# Patient Record
Sex: MALE | Race: WHITE | NOT HISPANIC OR LATINO | Employment: FULL TIME | URBAN - METROPOLITAN AREA
[De-identification: names, ages, dates, MRNs, and addresses within clinical notes are randomized per-mention and may not be internally consistent; named-entity substitution may affect disease eponyms.]

---

## 2022-07-11 LAB — FLEX SIG FOLLOW UP EXTERNAL: NORMAL

## 2022-07-19 DIAGNOSIS — M79.10 MYALGIA: ICD-10-CM

## 2022-07-19 DIAGNOSIS — K50.90 CROHN DISEASE: Primary | ICD-10-CM

## 2022-07-19 DIAGNOSIS — K91.850 POUCHITIS: ICD-10-CM

## 2022-08-09 DIAGNOSIS — K50.90 CROHN DISEASE: Primary | ICD-10-CM

## 2022-08-15 ENCOUNTER — HOSPITAL ENCOUNTER (OUTPATIENT)
Dept: RADIOLOGY | Facility: HOSPITAL | Age: 57
Discharge: HOME | End: 2022-08-15
Attending: INTERNAL MEDICINE
Payer: COMMERCIAL

## 2022-08-15 DIAGNOSIS — K50.90 CROHN DISEASE: ICD-10-CM

## 2022-08-15 PROCEDURE — 74177 CT ABD & PELVIS W/CONTRAST: CPT | Mod: TC

## 2022-08-15 PROCEDURE — 25500020 PHARM REV CODE 255: Performed by: INTERNAL MEDICINE

## 2022-08-15 RX ADMIN — IOHEXOL 100 ML: 350 INJECTION, SOLUTION INTRAVENOUS at 09:08

## 2024-03-12 ENCOUNTER — OFFICE VISIT (OUTPATIENT)
Dept: FAMILY MEDICINE | Facility: CLINIC | Age: 59
End: 2024-03-12
Payer: COMMERCIAL

## 2024-03-12 VITALS
TEMPERATURE: 99 F | DIASTOLIC BLOOD PRESSURE: 74 MMHG | WEIGHT: 274 LBS | SYSTOLIC BLOOD PRESSURE: 112 MMHG | HEIGHT: 69 IN | BODY MASS INDEX: 40.58 KG/M2 | OXYGEN SATURATION: 99 % | RESPIRATION RATE: 18 BRPM | HEART RATE: 94 BPM

## 2024-03-12 DIAGNOSIS — K50.118 CROHN'S DISEASE OF LARGE INTESTINE WITH OTHER COMPLICATION: Primary | ICD-10-CM

## 2024-03-12 DIAGNOSIS — Z12.5 ENCOUNTER FOR PROSTATE CANCER SCREENING: ICD-10-CM

## 2024-03-12 DIAGNOSIS — M79.10 MYALGIA: ICD-10-CM

## 2024-03-12 DIAGNOSIS — Z00.00 PREVENTATIVE HEALTH CARE: ICD-10-CM

## 2024-03-12 DIAGNOSIS — N20.0 KIDNEY STONES: ICD-10-CM

## 2024-03-12 PROCEDURE — 3074F SYST BP LT 130 MM HG: CPT | Mod: CPTII,S$GLB,, | Performed by: FAMILY MEDICINE

## 2024-03-12 PROCEDURE — 3008F BODY MASS INDEX DOCD: CPT | Mod: CPTII,S$GLB,, | Performed by: FAMILY MEDICINE

## 2024-03-12 PROCEDURE — 99999 PR PBB SHADOW E&M-NEW PATIENT-LVL IV: CPT | Mod: PBBFAC,,, | Performed by: FAMILY MEDICINE

## 2024-03-12 PROCEDURE — 1159F MED LIST DOCD IN RCRD: CPT | Mod: CPTII,S$GLB,, | Performed by: FAMILY MEDICINE

## 2024-03-12 PROCEDURE — 3078F DIAST BP <80 MM HG: CPT | Mod: CPTII,S$GLB,, | Performed by: FAMILY MEDICINE

## 2024-03-12 PROCEDURE — 99213 OFFICE O/P EST LOW 20 MIN: CPT | Mod: S$GLB,,, | Performed by: FAMILY MEDICINE

## 2024-03-12 RX ORDER — METHYLPREDNISOLONE 4 MG/1
TABLET ORAL
Qty: 1 EACH | Refills: 0 | Status: SHIPPED | OUTPATIENT
Start: 2024-03-12

## 2024-03-12 RX ORDER — USTEKINUMAB 90 MG/ML
INJECTION, SOLUTION SUBCUTANEOUS
COMMUNITY
Start: 2022-09-11

## 2024-03-12 NOTE — PROGRESS NOTES
Subjective:       Patient ID: Analy Fuchs is a 59 y.o. male.    Chief Complaint: Establish Care and chrohns disease      Patient is here to get established.  He has a long history of Crohn's disease has been on Humira in the past but was being prepared to transfer to [facility] but had several moves due to family use and is here getting established again he sees Dr. Cordero.  Patient has a surgical J-pouch.  Patient has had progressive muscle weakness and fatigue.  Patient has been on opiates in the past but has switch to medical marijuana because of side effects. Off Cipro now.  Patient has not worked for 3 years because of problems related to his Crohn's and medication.  Muscle symptoms worsened with exercise.                Allergies and Medications:   Review of patient's allergies indicates:   Allergen Reactions    Mesalamine Shortness Of Breath     Current Outpatient Medications   Medication Sig Dispense Refill    STELARA 90 mg/mL Syrg syringe       methylPREDNISolone (MEDROL DOSEPACK) 4 mg tablet use as directed 1 each 0     No current facility-administered medications for this visit.       Family History:   History reviewed. No pertinent family history.    Social History:   Social History     Socioeconomic History    Marital status:    Tobacco Use    Smoking status: Never    Smokeless tobacco: Never   Substance and Sexual Activity    Alcohol use: Yes     Comment: occ    Drug use: Not Currently     Types: Marijuana    Sexual activity: Yes     Partners: Female     Social Determinants of Health     Financial Resource Strain: Medium Risk (3/11/2024)    Overall Financial Resource Strain (CARDIA)     Difficulty of Paying Living Expenses: Somewhat hard   Food Insecurity: No Food Insecurity (3/11/2024)    Hunger Vital Sign     Worried About Running Out of Food in the Last Year: Never true     Ran Out of Food in the Last Year: Never true   Transportation Needs: No Transportation Needs (3/11/2024)    PRAPARE -  Transportation     Lack of Transportation (Medical): No     Lack of Transportation (Non-Medical): No   Physical Activity: Inactive (3/11/2024)    Exercise Vital Sign     Days of Exercise per Week: 0 days     Minutes of Exercise per Session: 0 min   Stress: Stress Concern Present (3/11/2024)    [place] [employer] - Occupational Stress Questionnaire     Feeling of Stress : To some extent   Social Connections: Unknown (3/11/2024)    Social Connection and Isolation Panel [NHANES]     Frequency of Communication with Friends and Family: More than three times a week     Frequency of Social Gatherings with Friends and Family: Three times a week     Active Member of Clubs or Organizations: No     Attends Club or Organization Meetings: Never     Marital Status:    Housing Stability: Low Risk  (3/11/2024)    Housing Stability Vital Sign     Unable to Pay for Housing in the Last Year: No     Number of Places Lived in the Last Year: 1     Unstable Housing in the Last Year: No       Review of Systems   Constitutional:  Positive for activity change. Negative for unexpected weight change.   HENT:  Negative for hearing loss, rhinorrhea and trouble swallowing.    Eyes:  Negative for discharge and visual disturbance.   Respiratory:  Negative for chest tightness and wheezing.    Cardiovascular:  Negative for chest pain and palpitations.   Gastrointestinal:  Negative for blood in stool, constipation, diarrhea and vomiting.   Endocrine: Negative for polydipsia and polyuria.   Genitourinary:  Negative for difficulty urinating, hematuria and urgency.   Musculoskeletal:  Positive for arthralgias. Negative for joint swelling and neck pain.   Neurological:  Positive for weakness and headaches.   Psychiatric/Behavioral:  Negative for confusion and dysphoric mood.        Objective:     Vitals:    03/12/24 0822   BP: 112/74   Pulse: 94   Resp: 18   Temp: 98.7 °F (37.1 °C)        Physical Exam  Vitals and nursing note  reviewed.   Constitutional:       General: He is not in acute distress.     Appearance: He is well-developed. He is not ill-appearing, toxic-appearing or diaphoretic.   HENT:      Head: Normocephalic.   Eyes:      Conjunctiva/sclera: Conjunctivae normal.      Pupils: Pupils are equal, round, and reactive to light.   Cardiovascular:      Rate and Rhythm: Normal rate and regular rhythm.      Heart sounds: Normal heart sounds. No murmur heard.     No friction rub. No gallop.   Pulmonary:      Effort: Pulmonary effort is normal. No respiratory distress.      Breath sounds: Normal breath sounds. No stridor. No wheezing, rhonchi or rales.   Chest:      Chest wall: No tenderness.   Skin:     General: Skin is warm and dry.   Psychiatric:         Behavior: Behavior normal.         Thought Content: Thought content normal.         Judgment: Judgment normal.         Assessment:       1. Crohn's disease of large intestine with other complication    2. Preventative health care    3. Encounter for prostate cancer screening    4. Kidney stones    5. Myalgia        Plan:       [facility]    Diagnoses and all orders for this visit:    Crohn's disease of large intestine with other complication  -     CBC Auto Differential; Future  -     C-Reactive Protein; Future  -     Comprehensive Metabolic Panel; Future  -     Lipid Panel; Future  -     methylPREDNISolone (MEDROL DOSEPACK) 4 mg tablet; use as directed    Preventative health care  -     HEPATITIS C ANTIBODY; Future  -     LIPID PANEL; Future  -     HIV 1/2 Ag/Ab (4th Gen); Future    Encounter for prostate cancer screening  -     PSA, Screening; Future    Kidney stones  -     Vitamin D; Future    Myalgia  -     CK Isoenzymes; Future  -     Ambulatory referral/consult to Rheumatology; Future  -     methylPREDNISolone (MEDROL DOSEPACK) 4 mg tablet; use as directed         Follow up in about 6 months (around 9/12/2024).

## 2024-03-14 ENCOUNTER — LAB VISIT (OUTPATIENT)
Dept: LAB | Facility: HOSPITAL | Age: 59
End: 2024-03-14
Attending: FAMILY MEDICINE
Payer: COMMERCIAL

## 2024-03-14 DIAGNOSIS — Z12.5 ENCOUNTER FOR PROSTATE CANCER SCREENING: ICD-10-CM

## 2024-03-14 DIAGNOSIS — N20.0 KIDNEY STONES: ICD-10-CM

## 2024-03-14 DIAGNOSIS — K50.118 CROHN'S DISEASE OF LARGE INTESTINE WITH OTHER COMPLICATION: ICD-10-CM

## 2024-03-14 DIAGNOSIS — Z00.00 PREVENTATIVE HEALTH CARE: ICD-10-CM

## 2024-03-14 DIAGNOSIS — M79.10 MYALGIA: ICD-10-CM

## 2024-03-14 LAB
25(OH)D3+25(OH)D2 SERPL-MCNC: 27 NG/ML (ref 30–96)
ALBUMIN SERPL BCP-MCNC: 3.9 G/DL (ref 3.5–5.2)
ALP SERPL-CCNC: 103 U/L (ref 55–135)
ALT SERPL W/O P-5'-P-CCNC: 21 U/L (ref 10–44)
ANION GAP SERPL CALC-SCNC: 10 MMOL/L (ref 8–16)
AST SERPL-CCNC: 23 U/L (ref 10–40)
BASOPHILS # BLD AUTO: 0.08 K/UL (ref 0–0.2)
BASOPHILS NFR BLD: 0.5 % (ref 0–1.9)
BILIRUB SERPL-MCNC: 0.7 MG/DL (ref 0.1–1)
BUN SERPL-MCNC: 14 MG/DL (ref 6–20)
CALCIUM SERPL-MCNC: 9.4 MG/DL (ref 8.7–10.5)
CHLORIDE SERPL-SCNC: 106 MMOL/L (ref 95–110)
CHOLEST SERPL-MCNC: 134 MG/DL (ref 120–199)
CHOLEST SERPL-MCNC: 134 MG/DL (ref 120–199)
CHOLEST/HDLC SERPL: 2.6 {RATIO} (ref 2–5)
CHOLEST/HDLC SERPL: 2.6 {RATIO} (ref 2–5)
CO2 SERPL-SCNC: 21 MMOL/L (ref 23–29)
COMPLEXED PSA SERPL-MCNC: 0.63 NG/ML (ref 0–4)
CREAT SERPL-MCNC: 0.9 MG/DL (ref 0.5–1.4)
CRP SERPL-MCNC: 6.2 MG/L (ref 0–8.2)
DIFFERENTIAL METHOD BLD: ABNORMAL
EOSINOPHIL # BLD AUTO: 0 K/UL (ref 0–0.5)
EOSINOPHIL NFR BLD: 0.1 % (ref 0–8)
ERYTHROCYTE [DISTWIDTH] IN BLOOD BY AUTOMATED COUNT: 12.9 % (ref 11.5–14.5)
EST. GFR  (NO RACE VARIABLE): >60 ML/MIN/1.73 M^2
GLUCOSE SERPL-MCNC: 108 MG/DL (ref 70–110)
HCT VFR BLD AUTO: 50 % (ref 40–54)
HCV AB SERPL QL IA: NORMAL
HDLC SERPL-MCNC: 51 MG/DL (ref 40–75)
HDLC SERPL-MCNC: 51 MG/DL (ref 40–75)
HDLC SERPL: 38.1 % (ref 20–50)
HDLC SERPL: 38.1 % (ref 20–50)
HGB BLD-MCNC: 16.5 G/DL (ref 14–18)
HIV 1+2 AB+HIV1 P24 AG SERPL QL IA: NORMAL
IMM GRANULOCYTES # BLD AUTO: 0.07 K/UL (ref 0–0.04)
IMM GRANULOCYTES NFR BLD AUTO: 0.4 % (ref 0–0.5)
LDLC SERPL CALC-MCNC: 71.8 MG/DL (ref 63–159)
LDLC SERPL CALC-MCNC: 71.8 MG/DL (ref 63–159)
LYMPHOCYTES # BLD AUTO: 1.4 K/UL (ref 1–4.8)
LYMPHOCYTES NFR BLD: 8.8 % (ref 18–48)
MCH RBC QN AUTO: 31.8 PG (ref 27–31)
MCHC RBC AUTO-ENTMCNC: 33 G/DL (ref 32–36)
MCV RBC AUTO: 96 FL (ref 82–98)
MONOCYTES # BLD AUTO: 0.9 K/UL (ref 0.3–1)
MONOCYTES NFR BLD: 5.3 % (ref 4–15)
NEUTROPHILS # BLD AUTO: 13.5 K/UL (ref 1.8–7.7)
NEUTROPHILS NFR BLD: 84.9 % (ref 38–73)
NONHDLC SERPL-MCNC: 83 MG/DL
NONHDLC SERPL-MCNC: 83 MG/DL
NRBC BLD-RTO: 0 /100 WBC
PLATELET # BLD AUTO: 348 K/UL (ref 150–450)
PMV BLD AUTO: 10.8 FL (ref 9.2–12.9)
POTASSIUM SERPL-SCNC: 4.3 MMOL/L (ref 3.5–5.1)
PROT SERPL-MCNC: 8.3 G/DL (ref 6–8.4)
RBC # BLD AUTO: 5.19 M/UL (ref 4.6–6.2)
SODIUM SERPL-SCNC: 137 MMOL/L (ref 136–145)
TRIGL SERPL-MCNC: 56 MG/DL (ref 30–150)
TRIGL SERPL-MCNC: 56 MG/DL (ref 30–150)
WBC # BLD AUTO: 15.92 K/UL (ref 3.9–12.7)

## 2024-03-14 PROCEDURE — 85025 COMPLETE CBC W/AUTO DIFF WBC: CPT | Performed by: FAMILY MEDICINE

## 2024-03-14 PROCEDURE — 36415 COLL VENOUS BLD VENIPUNCTURE: CPT | Performed by: FAMILY MEDICINE

## 2024-03-14 PROCEDURE — 84153 ASSAY OF PSA TOTAL: CPT | Performed by: FAMILY MEDICINE

## 2024-03-14 PROCEDURE — 87389 HIV-1 AG W/HIV-1&-2 AB AG IA: CPT | Performed by: FAMILY MEDICINE

## 2024-03-14 PROCEDURE — 86140 C-REACTIVE PROTEIN: CPT | Performed by: FAMILY MEDICINE

## 2024-03-14 PROCEDURE — 82552 ASSAY OF CPK IN BLOOD: CPT | Performed by: FAMILY MEDICINE

## 2024-03-14 PROCEDURE — 80061 LIPID PANEL: CPT | Performed by: FAMILY MEDICINE

## 2024-03-14 PROCEDURE — 86803 HEPATITIS C AB TEST: CPT | Performed by: FAMILY MEDICINE

## 2024-03-14 PROCEDURE — 82306 VITAMIN D 25 HYDROXY: CPT | Performed by: FAMILY MEDICINE

## 2024-03-14 PROCEDURE — 80053 COMPREHEN METABOLIC PANEL: CPT | Performed by: FAMILY MEDICINE

## 2024-03-15 ENCOUNTER — TELEPHONE (OUTPATIENT)
Dept: FAMILY MEDICINE | Facility: CLINIC | Age: 59
End: 2024-03-15
Payer: COMMERCIAL

## 2024-03-15 DIAGNOSIS — E55.9 VITAMIN D DEFICIENCY: Primary | ICD-10-CM

## 2024-03-15 RX ORDER — ERGOCALCIFEROL 1.25 MG/1
50000 CAPSULE ORAL
Qty: 4 CAPSULE | Refills: 11 | Status: SHIPPED | OUTPATIENT
Start: 2024-03-15 | End: 2025-03-15

## 2024-03-15 NOTE — PROGRESS NOTES
Vitamin-D level is low.  We will initiate vitamin-D 92606 units per week, and recheck vitamin-D in 3 months.  I will send in orders.

## 2024-03-15 NOTE — TELEPHONE ENCOUNTER
----- Message from Jovanny Ag MD sent at 3/15/2024  8:02 AM CDT -----  Vitamin-D level is low.  We will initiate vitamin-D 78428 units per week, and recheck vitamin-D in 3 months.  I will send in orders.

## 2024-03-19 LAB
CK BB CFR SERPL ELPH: 0 %
CK MB CFR SERPL ELPH: 0 % (ref 0–3.3)
CK MM CFR SERPL ELPH: 100 % (ref 96.7–100)
CK SERPL-CCNC: 53 U/L (ref 30–223)

## 2024-03-22 ENCOUNTER — OFFICE VISIT (OUTPATIENT)
Dept: RHEUMATOLOGY | Facility: CLINIC | Age: 59
End: 2024-03-22
Payer: COMMERCIAL

## 2024-03-22 VITALS
DIASTOLIC BLOOD PRESSURE: 81 MMHG | SYSTOLIC BLOOD PRESSURE: 121 MMHG | HEART RATE: 67 BPM | HEIGHT: 69 IN | BODY MASS INDEX: 41.54 KG/M2 | WEIGHT: 280.44 LBS

## 2024-03-22 DIAGNOSIS — K50.90 CROHN'S DISEASE WITHOUT COMPLICATION, UNSPECIFIED GASTROINTESTINAL TRACT LOCATION: ICD-10-CM

## 2024-03-22 DIAGNOSIS — M79.10 MYALGIA: ICD-10-CM

## 2024-03-22 DIAGNOSIS — M79.7 FIBROMYALGIA: Primary | ICD-10-CM

## 2024-03-22 PROCEDURE — 99999 PR PBB SHADOW E&M-EST. PATIENT-LVL III: CPT | Mod: PBBFAC,,, | Performed by: INTERNAL MEDICINE

## 2024-03-22 PROCEDURE — 3079F DIAST BP 80-89 MM HG: CPT | Mod: CPTII,S$GLB,, | Performed by: INTERNAL MEDICINE

## 2024-03-22 PROCEDURE — 3008F BODY MASS INDEX DOCD: CPT | Mod: CPTII,S$GLB,, | Performed by: INTERNAL MEDICINE

## 2024-03-22 PROCEDURE — 1160F RVW MEDS BY RX/DR IN RCRD: CPT | Mod: CPTII,S$GLB,, | Performed by: INTERNAL MEDICINE

## 2024-03-22 PROCEDURE — 1159F MED LIST DOCD IN RCRD: CPT | Mod: CPTII,S$GLB,, | Performed by: INTERNAL MEDICINE

## 2024-03-22 PROCEDURE — 3074F SYST BP LT 130 MM HG: CPT | Mod: CPTII,S$GLB,, | Performed by: INTERNAL MEDICINE

## 2024-03-22 PROCEDURE — 99204 OFFICE O/P NEW MOD 45 MIN: CPT | Mod: S$GLB,,, | Performed by: INTERNAL MEDICINE

## 2024-03-22 RX ORDER — GABAPENTIN 300 MG/1
300 CAPSULE ORAL 3 TIMES DAILY
Qty: 90 CAPSULE | Refills: 6 | Status: SHIPPED | OUTPATIENT
Start: 2024-03-22 | End: 2024-04-12 | Stop reason: SINTOL

## 2024-03-22 NOTE — PROGRESS NOTES
Patient name: Analy Fuchs  Date: 03/22/2024      History of Present Illness:    59 y.o. male with crohn's disease presents to the clinic for muscle pain x3 years. Three years ago, he was on started on daily Ciprofloxacin for recurrent J-pouch infections and continued it for about two years. Muscle pain gradually increased, then eventually became constant. Constant pain feels sore and achy and is located in hands, wrists, forearms, upper arms, thighs, and legs. He also has shoulder and hip pain when laying on sides. Pain wakes him up multiple times at night. Numbness/tingling in arms present when sitting in certain positions. He has to continue moving arms and legs every few minutes to prevent increased pain. Morning stiffness lasts about five minutes. He also reports pain with walking and hand/arm swelling with bowling. Shortness of breath also occurs with walking. Patient states that he feels overheated, nauseous, and fatigue by evenings. Narcotics were prescribed briefly but didn't help and caused night sweats. Nightly medical marijuana did help reduce nighttime pain. He is currently only taking Stalara and ergocalciferol. He does not take ibuprofen due to Crohn's and takes no other OTC pain meds. He has tried aspirin which didn't help. Recent steroid course given by PCP helped but pain resumed the next day.     Denies rashes, skin tightness, dry eye, dry mouth, joint swelling, and oral ulcers.  No history of blood clots.    Review of Systems   Constitutional:  Negative for chills, diaphoresis and fever.   HENT:  Negative for congestion, ear pain and sore throat.    Eyes:  Negative for pain and redness.   Respiratory:  Positive for shortness of breath. Negative for cough, hemoptysis and wheezing.    Cardiovascular:  Negative for chest pain and leg swelling.   Gastrointestinal:  Positive for diarrhea. Negative for abdominal pain, blood in stool, constipation, melena, nausea and vomiting.   Genitourinary:   "Negative for flank pain and hematuria.   Musculoskeletal:  Positive for joint pain (shoulders, wrists, hips) and myalgias. Negative for back pain, falls and neck pain.   Skin:  Negative for rash.   Neurological:  Positive for tingling (hands, forearms), weakness and headaches. Negative for dizziness, tremors, focal weakness, seizures and loss of consciousness.   Endo/Heme/Allergies:  Does not bruise/bleed easily.   Psychiatric/Behavioral:  Negative for depression, hallucinations, substance abuse and suicidal ideas. The patient is not nervous/anxious and does not have insomnia.       Past Medical History:  -Crohn's disease  -Kidney stones    Current Outpatient Medications on File Prior to Visit   Medication Sig Dispense Refill    ergocalciferol (ERGOCALCIFEROL) 50,000 unit Cap Take 1 capsule (50,000 Units total) by mouth every 7 days. 4 capsule 11    methylPREDNISolone (MEDROL DOSEPACK) 4 mg tablet use as directed 1 each 0    STELARA 90 mg/mL Syrg syringe        No current facility-administered medications on file prior to visit.          OBJECTIVE    Vitals:    HR 67     /81     Weight 127.2 kg     Height 5' 9"     BMI 41.41    Physical Exam   Constitutional: normal appearance. He appears obese.  Non-toxic appearance. No distress.   HENT:   Head: Normocephalic and atraumatic.   Mouth/Throat: Mucous membranes are moist. Oropharynx is clear.   Eyes: Pupils are equal, round, and reactive to light.   Cardiovascular: Normal rate and regular rhythm. Pulmonary:      Effort: Pulmonary effort is normal.      Breath sounds: Normal breath sounds.     Abdominal: Normal appearance and bowel sounds are normal.   Musculoskeletal:      Cervical back: Normal range of motion and neck supple.   Neurological: He is alert.   Skin: Skin is warm and dry.   Psychiatric: His behavior is normal. Mood, judgment and thought content normal.       Physical Exam:  -Bilateral wrist pain with over-extension   -Right hand: wrist,    -Arms: " "forearm, upper arm, and deltoid tenderness "sore"  -Shoulders: pain with flexion and abduction, tender to palpation.   -Legs: multiple tender areas    Labs:    from 03/14/2024  -CBC: WBC 15.92  -CMP: normal  -CRP: normal  -CK: normal  -Hep C: negative  -HIV: negative      Assessment/Plan:    Fibromyalgia. Patient has chronic muscle pain for 3 years, trouble sleeping, fatigue, and headaches.   Start Gabapentin 300 mg PO three times a day. If unable to tolerate, stop medication.  Recommended regular exercise  Follow up in 4 months    I have personally taken the history and examined the patient and concur with the student's note as above.  He has a long history of Crohn's disease.  His disease has been inactive recently.  He has been on Stelara which has helped control his disease.  Three years ago he was placed on Cipro for recurrent infections.  He remained on it for 2 years.  During this time he developed diffuse aching.  The pain was of gradual onset.  The pain is been constant.  It does not occur at a particular time of the day.  He has 5 minutes of morning stiffness.  He was taken off Cipro 1 year ago but his pain persists.  He has had no joint swelling.  The pain has not been helped by Stelara.  He has been taking no medication for the pain.      He has had no recent fevers.  He has occipital headaches.  He has had no rash, conjunctivitis, oral ulcers, dry eye or mouth, Raynaud's phenomena, pleurisy, urethral discharge or ulcers, numbness or tingling.  He has no thrombophlebitis.  He denies any problems in the neck or back.  He states his weight has been stable.      Physical examination:  Musculoskeletal:  He is full range of motion of all joints.  He has no soft tissue swelling, erythema, or increased warmth.  He has multiple tender areas in both articular and nonarticular areas.    Assessment:  Clinically he only has fibromyalgia.  I do not think his pain is related to his inflammatory bowel " disease.    Plans:  1. I started him on gabapentin 300 mg t.i.d..  I told him the dose could be increased in the future.  2.  Return to see me in 4 months              Answers submitted by the patient for this visit:  Rheumatology Questionnaire (Submitted on 3/15/2024)  mouth sores: No  trouble swallowing: No  unexpected weight change: No  genital sore: No

## 2024-04-11 ENCOUNTER — PATIENT MESSAGE (OUTPATIENT)
Dept: RHEUMATOLOGY | Facility: CLINIC | Age: 59
End: 2024-04-11
Payer: COMMERCIAL

## 2024-04-12 RX ORDER — PREGABALIN 25 MG/1
25 CAPSULE ORAL 2 TIMES DAILY
Qty: 60 CAPSULE | Refills: 2 | Status: SHIPPED | OUTPATIENT
Start: 2024-04-12 | End: 2024-05-06 | Stop reason: SDUPTHER

## 2024-05-01 ENCOUNTER — PATIENT MESSAGE (OUTPATIENT)
Dept: RHEUMATOLOGY | Facility: CLINIC | Age: 59
End: 2024-05-01
Payer: COMMERCIAL

## 2024-05-06 RX ORDER — PREGABALIN 75 MG/1
75 CAPSULE ORAL 2 TIMES DAILY
Qty: 60 CAPSULE | Refills: 2 | Status: SHIPPED | OUTPATIENT
Start: 2024-05-06 | End: 2024-05-31 | Stop reason: SDUPTHER

## 2024-05-31 RX ORDER — PREGABALIN 75 MG/1
CAPSULE ORAL
Qty: 90 CAPSULE | Refills: 2 | Status: SHIPPED | OUTPATIENT
Start: 2024-05-31

## 2024-06-19 ENCOUNTER — PATIENT MESSAGE (OUTPATIENT)
Dept: RHEUMATOLOGY | Facility: CLINIC | Age: 59
End: 2024-06-19
Payer: COMMERCIAL

## 2024-06-24 ENCOUNTER — OFFICE VISIT (OUTPATIENT)
Dept: FAMILY MEDICINE | Facility: CLINIC | Age: 59
End: 2024-06-24
Payer: COMMERCIAL

## 2024-06-24 VITALS
TEMPERATURE: 98 F | RESPIRATION RATE: 18 BRPM | OXYGEN SATURATION: 97 % | SYSTOLIC BLOOD PRESSURE: 110 MMHG | DIASTOLIC BLOOD PRESSURE: 84 MMHG | WEIGHT: 262 LBS | BODY MASS INDEX: 38.8 KG/M2 | HEIGHT: 69 IN | HEART RATE: 67 BPM

## 2024-06-24 DIAGNOSIS — J20.9 ACUTE BRONCHITIS, UNSPECIFIED ORGANISM: Primary | ICD-10-CM

## 2024-06-24 PROCEDURE — 99213 OFFICE O/P EST LOW 20 MIN: CPT | Mod: S$GLB,,, | Performed by: FAMILY MEDICINE

## 2024-06-24 PROCEDURE — 99999 PR PBB SHADOW E&M-EST. PATIENT-LVL III: CPT | Mod: PBBFAC,,, | Performed by: FAMILY MEDICINE

## 2024-06-24 PROCEDURE — 3079F DIAST BP 80-89 MM HG: CPT | Mod: CPTII,S$GLB,, | Performed by: FAMILY MEDICINE

## 2024-06-24 PROCEDURE — 1159F MED LIST DOCD IN RCRD: CPT | Mod: CPTII,S$GLB,, | Performed by: FAMILY MEDICINE

## 2024-06-24 PROCEDURE — 3008F BODY MASS INDEX DOCD: CPT | Mod: CPTII,S$GLB,, | Performed by: FAMILY MEDICINE

## 2024-06-24 PROCEDURE — 3074F SYST BP LT 130 MM HG: CPT | Mod: CPTII,S$GLB,, | Performed by: FAMILY MEDICINE

## 2024-06-24 RX ORDER — AZITHROMYCIN 250 MG/1
250 TABLET, FILM COATED ORAL DAILY
Qty: 6 TABLET | Refills: 0 | Status: SHIPPED | OUTPATIENT
Start: 2024-06-24 | End: 2024-06-30

## 2024-06-24 RX ORDER — PROMETHAZINE HYDROCHLORIDE AND DEXTROMETHORPHAN HYDROBROMIDE 6.25; 15 MG/5ML; MG/5ML
10 SYRUP ORAL NIGHTLY
Qty: 180 ML | Refills: 2 | Status: SHIPPED | OUTPATIENT
Start: 2024-06-24 | End: 2024-08-17

## 2024-06-24 NOTE — PROGRESS NOTES
Subjective:       Patient ID: Analy Fuchs is a 59 y.o. male.    Chief Complaint: Cough and Nasal Congestion      Is here because of upper respiratory symptoms that started approximately 7 days ago getting off of a flight.  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  3  Lab Results       Component                Value               Date                       WBC                      15.92 (H)           03/14/2024                 HGB                      16.5                03/14/2024                 HCT                      50.0                03/14/2024                 PLT                      348                 03/14/2024                 CHOL                     134                 03/14/2024                 CHOL                     134                 03/14/2024                 TRIG                     56                  03/14/2024                 TRIG                     56                  03/14/2024                 HDL                      51                  03/14/2024                 HDL                      51                  03/14/2024                 ALT                      21                  03/14/2024                 AST                      23                  03/14/2024                 NA                       137                 03/14/2024                 K                        4.3                 03/14/2024                 CL                       106                 03/14/2024                 CREATININE               0.9                 03/14/2024                 BUN                      14                  03/14/2024                 CO2                      21 (L)              03/14/2024                 PSA                      0.63                03/14/2024                Sore Throat   This is a new problem. The current episode started in the past 7 days. The problem has been gradually worsening. Neither side of throat is  experiencing more pain than the other. The fever has been present for 5 days or more. The pain is at a severity of 4/10. The pain is moderate. Associated symptoms include congestion, coughing, headaches and a plugged ear sensation. Pertinent negatives include no abdominal pain, diarrhea, drooling, ear discharge, ear pain, hoarse voice, neck pain, shortness of breath, stridor, swollen glands, trouble swallowing or vomiting. He has tried acetaminophen for the symptoms. The treatment provided no relief.       Allergies and Medications:   Review of patient's allergies indicates:   Allergen Reactions    Mesalamine Shortness Of Breath     Current Outpatient Medications   Medication Sig Dispense Refill    ergocalciferol (ERGOCALCIFEROL) 50,000 unit Cap Take 1 capsule (50,000 Units total) by mouth every 7 days. 4 capsule 11    pregabalin (LYRICA) 75 MG capsule One in the morning, 2 at bedtime. 90 capsule 2    STELARA 90 mg/mL Syrg syringe       azithromycin (Z-RUIZ) 250 MG tablet Take 1 tablet (250 mg total) by mouth once daily. po on day 1 then 1 tab po on days 2-5 for 6 doses 6 tablet 0    promethazine-dextromethorphan (PROMETHAZINE-DM) 6.25-15 mg/5 mL Syrp Take 10 mLs by mouth every evening. 180 mL 2     No current facility-administered medications for this visit.       Family History:   No family history on file.    Social History:   Social History     Socioeconomic History    Marital status:    Tobacco Use    Smoking status: Former     Current packs/day: 0.00     Types: Cigarettes     Quit date: 2000     Years since quittin.4    Smokeless tobacco: Never   Substance and Sexual Activity    Alcohol use: Yes     Comment: occ    Drug use: Not Currently     Types: Marijuana    Sexual activity: Yes     Partners: Female     Social Determinants of Health     Financial Resource Strain: Medium Risk (3/11/2024)    Overall Financial Resource Strain (CARDIA)     Difficulty of Paying Living Expenses: Somewhat hard   Food  Insecurity: No Food Insecurity (3/11/2024)    Hunger Vital Sign     Worried About Running Out of Food in the Last Year: Never true     Ran Out of Food in the Last Year: Never true   Transportation Needs: No Transportation Needs (3/11/2024)    PRAPARE - Transportation     Lack of Transportation (Medical): No     Lack of Transportation (Non-Medical): No   Physical Activity: Inactive (3/11/2024)    Exercise Vital Sign     Days of Exercise per Week: 0 days     Minutes of Exercise per Session: 0 min   Stress: Stress Concern Present (3/11/2024)    [facility] [place] of [employer]ational [employer] - Occupational Stress Questionnaire     Feeling of Stress : To some extent   Housing Stability: Low Risk  (3/11/2024)    Housing Stability Vital Sign     Unable to Pay for Housing in the Last Year: No     Number of Places Lived in the Last Year: 1     Unstable Housing in the Last Year: No       Review of Systems   Constitutional:  Negative for chills and fever.   HENT:  Positive for congestion and sore throat. Negative for drooling, ear discharge, ear pain, hoarse voice, postnasal drip and trouble swallowing.    Respiratory:  Positive for cough. Negative for shortness of breath and stridor.    Cardiovascular:  Negative for chest pain.   Gastrointestinal:  Negative for abdominal pain, diarrhea and vomiting.   Musculoskeletal:  Negative for neck pain.   Skin:  Negative for rash.   Neurological:  Positive for headaches.       Objective:     Vitals:    06/24/24 1507   BP: 110/84   Pulse: 67   Resp: 18   Temp: 98.2 °F (36.8 °C)        Physical Exam  Vitals and nursing note reviewed.   Constitutional:       General: He is not in acute distress.     Appearance: He is well-developed. He is not diaphoretic.   HENT:      Head: Normocephalic and atraumatic.      Right Ear: Hearing, tympanic membrane, ear canal and external ear normal. No decreased hearing noted. No drainage, swelling or tenderness. No middle ear effusion. No foreign body. No  hemotympanum. Tympanic membrane is not injected, scarred, perforated, erythematous, retracted or bulging. Tympanic membrane has normal mobility.      Left Ear: Hearing, tympanic membrane, ear canal and external ear normal. No decreased hearing noted. No drainage, swelling or tenderness.  No middle ear effusion. No foreign body. No hemotympanum. Tympanic membrane is not injected, scarred, perforated, erythematous, retracted or bulging. Tympanic membrane has normal mobility.      Nose: Nose normal. No nasal deformity, septal deviation, laceration, mucosal edema or rhinorrhea.      Right Sinus: No maxillary sinus tenderness or frontal sinus tenderness.      Left Sinus: No maxillary sinus tenderness or frontal sinus tenderness.      Mouth/Throat:      Dentition: Normal dentition.      Pharynx: Uvula midline. No oropharyngeal exudate or posterior oropharyngeal erythema.      Tonsils: No tonsillar abscesses.   Eyes:      General: No scleral icterus.        Right eye: No discharge.         Left eye: No discharge.      Conjunctiva/sclera: Conjunctivae normal.      Pupils: Pupils are equal, round, and reactive to light.   Neck:      Thyroid: No thyromegaly.   Cardiovascular:      Rate and Rhythm: Normal rate and regular rhythm.      Heart sounds: Normal heart sounds. No murmur heard.     No friction rub. No gallop.   Pulmonary:      Effort: Pulmonary effort is normal. No respiratory distress.      Breath sounds: Normal breath sounds. No stridor. No wheezing, rhonchi or rales.   Chest:      Chest wall: No tenderness.   Musculoskeletal:      Cervical back: Normal range of motion and neck supple.   Lymphadenopathy:      Cervical: No cervical adenopathy.       P.o. CT COVID test is positive.  Assessment:       1. Acute bronchitis, unspecified organism        Plan:       Analy was seen today for cough and nasal congestion.    Diagnoses and all orders for this visit:    Acute bronchitis, unspecified organism  -     POCT COVID-19  Rapid Screening  -     promethazine-dextromethorphan (PROMETHAZINE-DM) 6.25-15 mg/5 mL Syrp; Take 10 mLs by mouth every evening.  -     azithromycin (Z-RUIZ) 250 MG tablet; Take 1 tablet (250 mg total) by mouth once daily. po on day 1 then 1 tab po on days 2-5 for 6 doses         No follow-ups on file.

## 2024-06-28 ENCOUNTER — PATIENT MESSAGE (OUTPATIENT)
Dept: FAMILY MEDICINE | Facility: CLINIC | Age: 59
End: 2024-06-28
Payer: COMMERCIAL

## 2024-08-02 ENCOUNTER — PATIENT MESSAGE (OUTPATIENT)
Dept: ADMINISTRATIVE | Facility: HOSPITAL | Age: 59
End: 2024-08-02
Payer: COMMERCIAL

## 2024-08-06 ENCOUNTER — PATIENT OUTREACH (OUTPATIENT)
Dept: ADMINISTRATIVE | Facility: HOSPITAL | Age: 59
End: 2024-08-06
Payer: COMMERCIAL

## 2024-09-05 ENCOUNTER — OFFICE VISIT (OUTPATIENT)
Dept: FAMILY MEDICINE | Facility: CLINIC | Age: 59
End: 2024-09-05
Payer: COMMERCIAL

## 2024-09-05 VITALS
BODY MASS INDEX: 39.4 KG/M2 | WEIGHT: 266 LBS | SYSTOLIC BLOOD PRESSURE: 100 MMHG | DIASTOLIC BLOOD PRESSURE: 60 MMHG | HEIGHT: 69 IN | TEMPERATURE: 98 F | OXYGEN SATURATION: 97 % | RESPIRATION RATE: 18 BRPM | HEART RATE: 98 BPM

## 2024-09-05 DIAGNOSIS — K50.10 CROHN'S DISEASE OF LARGE INTESTINE WITHOUT COMPLICATION: ICD-10-CM

## 2024-09-05 DIAGNOSIS — K50.118 CROHN'S DISEASE OF LARGE INTESTINE WITH OTHER COMPLICATION: Primary | ICD-10-CM

## 2024-09-05 DIAGNOSIS — R79.82 ELEVATED C-REACTIVE PROTEIN (CRP): ICD-10-CM

## 2024-09-05 DIAGNOSIS — Z00.00 PREVENTATIVE HEALTH CARE: ICD-10-CM

## 2024-09-05 DIAGNOSIS — M79.7 FIBROMYALGIA: ICD-10-CM

## 2024-09-05 PROCEDURE — 3078F DIAST BP <80 MM HG: CPT | Mod: CPTII,S$GLB,, | Performed by: FAMILY MEDICINE

## 2024-09-05 PROCEDURE — 99213 OFFICE O/P EST LOW 20 MIN: CPT | Mod: S$GLB,,, | Performed by: FAMILY MEDICINE

## 2024-09-05 PROCEDURE — 3008F BODY MASS INDEX DOCD: CPT | Mod: CPTII,S$GLB,, | Performed by: FAMILY MEDICINE

## 2024-09-05 PROCEDURE — 1159F MED LIST DOCD IN RCRD: CPT | Mod: CPTII,S$GLB,, | Performed by: FAMILY MEDICINE

## 2024-09-05 PROCEDURE — 3074F SYST BP LT 130 MM HG: CPT | Mod: CPTII,S$GLB,, | Performed by: FAMILY MEDICINE

## 2024-09-05 PROCEDURE — 99999 PR PBB SHADOW E&M-EST. PATIENT-LVL IV: CPT | Mod: PBBFAC,,, | Performed by: FAMILY MEDICINE

## 2024-09-05 NOTE — PROGRESS NOTES
Subjective:       Patient ID: Analy Fuchs is a 59 y.o. male.    Chief Complaint: chrohns disease      Patient is here for six-month follow-up he did see his GI doctor yesterday and had some abnormal labs.  Lab Results       Component                Value               Date                       WBC                      15.92 (H)           03/14/2024                 HGB                      16.5                03/14/2024                 HCT                      50.0                03/14/2024                 PLT                      348                 03/14/2024                 CHOL                     134                 03/14/2024                 CHOL                     134                 03/14/2024                 TRIG                     56                  03/14/2024                 TRIG                     56                  03/14/2024                 HDL                      51                  03/14/2024                 HDL                      51                  03/14/2024                 ALT                      21                  03/14/2024                 AST                      23                  03/14/2024                 NA                       137                 03/14/2024                 K                        4.3                 03/14/2024                 CL                       106                 03/14/2024                 CREATININE               0.9                 03/14/2024                 BUN                      14                  03/14/2024                 CO2                      21 (L)              03/14/2024                 PSA                      0.63                03/14/2024            He had an elevated CRP.  11.2.  Has noticed some looser stools with mucus but no blood denies fever.  Taking Lyrica for fibromyalgia.  Does get some relief with marijuana.  Wt Readings from Last 4 Encounters:  09/05/24 : 120.7 kg (266 lb)  06/24/24 : 118.8 kg (262 lb)  03/22/24 : 127.2  kg (280 lb 6.8 oz)  24 : 124.3 kg (274 lb)  Has completely stopped alcohol.               Allergies and Medications:   Review of patient's allergies indicates:   Allergen Reactions    Mesalamine Shortness Of Breath     Current Outpatient Medications   Medication Sig Dispense Refill    ergocalciferol (ERGOCALCIFEROL) 50,000 unit Cap Take 1 capsule (50,000 Units total) by mouth every 7 days. 4 capsule 11    pregabalin (LYRICA) 75 MG capsule One in the morning, 2 at bedtime. 90 capsule 2    STELARA 90 mg/mL Syrg syringe        No current facility-administered medications for this visit.       Family History:   No family history on file.    Social History:   Social History     Socioeconomic History    Marital status:    Tobacco Use    Smoking status: Former     Current packs/day: 0.00     Types: Cigarettes     Quit date:      Years since quittin.6    Smokeless tobacco: Never   Substance and Sexual Activity    Alcohol use: Yes     Comment: occ    Drug use: Not Currently     Types: Marijuana    Sexual activity: Yes     Partners: Female     Social Determinants of Health     Financial Resource Strain: Medium Risk (3/11/2024)    Overall Financial Resource Strain (CARDIA)     Difficulty of Paying Living Expenses: Somewhat hard   Food Insecurity: No Food Insecurity (3/11/2024)    Hunger Vital Sign     Worried About Running Out of Food in the Last Year: Never true     Ran Out of Food in the Last Year: Never true   Transportation Needs: No Transportation Needs (3/11/2024)    PRAPARE - Transportation     Lack of Transportation (Medical): No     Lack of Transportation (Non-Medical): No   Physical Activity: Inactive (3/11/2024)    Exercise Vital Sign     Days of Exercise per Week: 0 days     Minutes of Exercise per Session: 0 min   Stress: Stress Concern Present (3/11/2024)    [employer] - Occupational Stress Questionnaire     Feeling of Stress : To some extent   Housing  Stability: Low Risk  (3/11/2024)    Housing Stability Vital Sign     Unable to Pay for Housing in the Last Year: No     Number of Places Lived in the Last Year: 1     Unstable Housing in the Last Year: No       Review of Systems   Constitutional:  Negative for activity change and unexpected weight change.   HENT:  Negative for hearing loss, rhinorrhea and trouble swallowing.    Eyes:  Negative for discharge and visual disturbance.   Respiratory:  Negative for chest tightness and wheezing.    Cardiovascular:  Negative for chest pain and palpitations.   Gastrointestinal:  Negative for blood in stool, constipation, diarrhea and vomiting.   Endocrine: Negative for polydipsia and polyuria.   Genitourinary:  Negative for difficulty urinating, hematuria and urgency.   Musculoskeletal:  Negative for arthralgias, joint swelling and neck pain.   Neurological:  Negative for weakness and headaches.   Psychiatric/Behavioral:  Negative for confusion and dysphoric mood.        Objective:     Vitals:    09/05/24 0939   BP: 100/60   Pulse: 98   Resp: 18   Temp: 97.8 °F (36.6 °C)        Physical Exam  Vitals and nursing note reviewed.   Constitutional:       Appearance: He is well-developed. He is not diaphoretic.   HENT:      Head: Normocephalic.   Eyes:      Conjunctiva/sclera: Conjunctivae normal.      Pupils: Pupils are equal, round, and reactive to light.   Neck:      Vascular: No carotid bruit.   Cardiovascular:      Rate and Rhythm: Normal rate and regular rhythm.      Heart sounds: Normal heart sounds. No murmur heard.     No friction rub. No gallop.   Pulmonary:      Effort: Pulmonary effort is normal. No respiratory distress.      Breath sounds: Normal breath sounds. No stridor. No wheezing, rhonchi or rales.   Chest:      Chest wall: No tenderness.   Musculoskeletal:      Cervical back: No rigidity or tenderness.   Lymphadenopathy:      Cervical: No cervical adenopathy.   Skin:     General: Skin is warm and dry.    Psychiatric:         Behavior: Behavior normal.         Thought Content: Thought content normal.         Judgment: Judgment normal.         Assessment:       1. Crohn's disease of large intestine with other complication    2. Elevated C-reactive protein (CRP)    3. Preventative health care    4. Fibromyalgia    5. Crohn's disease of large intestine without complication        Plan:       Analy was seen today for chrohns disease.    Diagnoses and all orders for this visit:    Crohn's disease of large intestine with other complication  -     X-Ray Chest PA And Lateral; Future  -     Sedimentation rate; Future    Elevated C-reactive protein (CRP)  -     X-Ray Chest PA And Lateral; Future  -     Sedimentation rate; Future    Preventative health care  -     Hemoglobin A1C; Future    Fibromyalgia    Crohn's disease of large intestine without complication         Follow up in about 3 months (around 12/5/2024).

## 2024-09-06 ENCOUNTER — HOSPITAL ENCOUNTER (OUTPATIENT)
Dept: RADIOLOGY | Facility: HOSPITAL | Age: 59
Discharge: HOME | End: 2024-09-06
Attending: FAMILY MEDICINE
Payer: COMMERCIAL

## 2024-09-06 ENCOUNTER — LAB VISIT (OUTPATIENT)
Dept: LAB | Facility: HOSPITAL | Age: 59
End: 2024-09-06
Attending: FAMILY MEDICINE
Payer: COMMERCIAL

## 2024-09-06 ENCOUNTER — TELEPHONE (OUTPATIENT)
Dept: FAMILY MEDICINE | Facility: CLINIC | Age: 59
End: 2024-09-06
Payer: COMMERCIAL

## 2024-09-06 DIAGNOSIS — R79.82 ELEVATED C-REACTIVE PROTEIN (CRP): ICD-10-CM

## 2024-09-06 DIAGNOSIS — Z00.00 PREVENTATIVE HEALTH CARE: ICD-10-CM

## 2024-09-06 DIAGNOSIS — K50.118 CROHN'S DISEASE OF LARGE INTESTINE WITH OTHER COMPLICATION: ICD-10-CM

## 2024-09-06 LAB
ERYTHROCYTE [SEDIMENTATION RATE] IN BLOOD BY WESTERGREN METHOD: 12 MM/HR (ref 0–10)
ESTIMATED AVG GLUCOSE: 105 MG/DL (ref 68–131)
HBA1C MFR BLD: 5.3 % (ref 4.5–6.2)

## 2024-09-06 PROCEDURE — 85651 RBC SED RATE NONAUTOMATED: CPT | Performed by: FAMILY MEDICINE

## 2024-09-06 PROCEDURE — 71046 X-RAY EXAM CHEST 2 VIEWS: CPT | Mod: TC

## 2024-09-06 PROCEDURE — 71046 X-RAY EXAM CHEST 2 VIEWS: CPT | Mod: 26,,, | Performed by: RADIOLOGY

## 2024-09-06 PROCEDURE — 36415 COLL VENOUS BLD VENIPUNCTURE: CPT | Performed by: FAMILY MEDICINE

## 2024-09-06 PROCEDURE — 83036 HEMOGLOBIN GLYCOSYLATED A1C: CPT | Performed by: FAMILY MEDICINE

## 2024-09-06 NOTE — TELEPHONE ENCOUNTER
----- Message from Jovanny Ag MD sent at 9/6/2024 11:13 AM CDT -----  Mildly elevated sed rate.  Correlates with a very mildly elevated CRP I would follow-up with GI

## 2024-09-12 RX ORDER — PREGABALIN 75 MG/1
CAPSULE ORAL
Qty: 90 CAPSULE | Refills: 0 | Status: SHIPPED | OUTPATIENT
Start: 2024-09-12

## 2024-09-18 RX ORDER — PREGABALIN 75 MG/1
CAPSULE ORAL
Qty: 90 CAPSULE | Refills: 0 | Status: SHIPPED | OUTPATIENT
Start: 2024-09-18

## 2024-10-01 LAB — FLEX SIG FOLLOW UP EXTERNAL: NORMAL

## 2024-10-07 ENCOUNTER — PATIENT OUTREACH (OUTPATIENT)
Dept: ADMINISTRATIVE | Facility: HOSPITAL | Age: 59
End: 2024-10-07
Payer: COMMERCIAL

## 2024-10-07 NOTE — PROGRESS NOTES
Population Health Chart Review & Patient Outreach Details      Additional [facility] Health Notes:               Updates Requested / Reviewed:      Updated Care Coordination Note, Care Everywhere, , Care Team Updated, and Immunizations Reconciliation Completed or Queried: [facility] Topics Overdue:      [facility] Score: 0     Patient is not due for any topics at this time.                       Health Maintenance Topic(s) Outreach Outcomes & Actions Taken:    Colorectal Cancer Screening - Outreach Outcomes & Actions Taken  : External Records Uploaded, Care Team Updated, & History Updated if Applicable

## 2024-10-08 ENCOUNTER — OFFICE VISIT (OUTPATIENT)
Dept: RHEUMATOLOGY | Facility: CLINIC | Age: 59
End: 2024-10-08
Payer: COMMERCIAL

## 2024-10-08 VITALS
SYSTOLIC BLOOD PRESSURE: 123 MMHG | HEART RATE: 82 BPM | DIASTOLIC BLOOD PRESSURE: 83 MMHG | WEIGHT: 273.56 LBS | BODY MASS INDEX: 40.4 KG/M2

## 2024-10-08 DIAGNOSIS — M79.7 FIBROMYALGIA: Primary | ICD-10-CM

## 2024-10-08 DIAGNOSIS — K50.10 CROHN'S DISEASE OF LARGE INTESTINE WITHOUT COMPLICATION: ICD-10-CM

## 2024-10-08 PROCEDURE — 3079F DIAST BP 80-89 MM HG: CPT | Mod: CPTII,S$GLB,, | Performed by: INTERNAL MEDICINE

## 2024-10-08 PROCEDURE — 3008F BODY MASS INDEX DOCD: CPT | Mod: CPTII,S$GLB,, | Performed by: INTERNAL MEDICINE

## 2024-10-08 PROCEDURE — 99999 PR PBB SHADOW E&M-EST. PATIENT-LVL III: CPT | Mod: PBBFAC,,, | Performed by: INTERNAL MEDICINE

## 2024-10-08 PROCEDURE — 1160F RVW MEDS BY RX/DR IN RCRD: CPT | Mod: CPTII,S$GLB,, | Performed by: INTERNAL MEDICINE

## 2024-10-08 PROCEDURE — 1159F MED LIST DOCD IN RCRD: CPT | Mod: CPTII,S$GLB,, | Performed by: INTERNAL MEDICINE

## 2024-10-08 PROCEDURE — 99213 OFFICE O/P EST LOW 20 MIN: CPT | Mod: S$GLB,,, | Performed by: INTERNAL MEDICINE

## 2024-10-08 PROCEDURE — 3074F SYST BP LT 130 MM HG: CPT | Mod: CPTII,S$GLB,, | Performed by: INTERNAL MEDICINE

## 2024-10-08 PROCEDURE — 3044F HG A1C LEVEL LT 7.0%: CPT | Mod: CPTII,S$GLB,, | Performed by: INTERNAL MEDICINE

## 2024-10-08 RX ORDER — PREGABALIN 100 MG/1
CAPSULE ORAL
Qty: 90 CAPSULE | Refills: 5 | Status: SHIPPED | OUTPATIENT
Start: 2024-10-08

## 2024-10-08 RX ORDER — HYDROCORTISONE ACETATE 25 MG/1
SUPPOSITORY RECTAL
COMMUNITY
Start: 2024-10-01

## 2024-10-08 RX ORDER — PREDNISONE 10 MG/1
TABLET ORAL
COMMUNITY
Start: 2024-10-01

## 2024-10-10 NOTE — PROGRESS NOTES
History of present illness:  59-year-old male I saw initially in March.  He has a 3 year history of diffuse aching.  This began when he was on Cipro for diffuse aching.  He has a history of Crohn's disease and has been on Stelara.  Clinically he has no evidence to suggest an inflammatory arthritis.  I felt he most likely had fibromyalgia.  I placed him initially on gabapentin.    He had a reaction to gabapentin and I changed him to Lyrica.  He is now on 75 mg in the morning and 150 mg at bedtime.  He is tolerating the Lyrica.  He still has some aching but it has improved.  He had no response to Tylenol.  His pain is worse with activity.  Massage gives him some relief.  He has not tried topical medications.  He has had no joint swelling.  He has had no muscle weakness.    He had a recent sigmoidoscopy which showed some evidence of active disease.  Was placed on prednisone.  This had no effect on his pain.  He was placed on an increased dose of Stelara.  He denies any other recent medical problems.      Physical examination was not performed, the entire time was counseling.    Assessment:  Fibromyalgia     Plans:   1. Increase Lyrica to 100 mg in the morning and 200 mg at bedtime.  I told him the dose could be increased up to 450 mg daily.  2.  Return in 6 months    Answers submitted by the patient for this visit:  Rheumatology Questionnaire (Submitted on 10/1/2024)  fever: No  eye redness: No  mouth sores: No  headaches: No  shortness of breath: No  chest pain: No  trouble swallowing: No  diarrhea: Yes  constipation: No  unexpected weight change: No  genital sore: No  During the last 3 days, have you had a skin rash?: No  Bruises or bleeds easily: No  cough: No

## 2024-12-11 ENCOUNTER — PATIENT MESSAGE (OUTPATIENT)
Dept: RHEUMATOLOGY | Facility: CLINIC | Age: 59
End: 2024-12-11
Payer: COMMERCIAL

## 2024-12-11 DIAGNOSIS — M79.7 FIBROMYALGIA: Primary | ICD-10-CM

## 2024-12-11 RX ORDER — PREGABALIN 150 MG/1
CAPSULE ORAL
Qty: 90 CAPSULE | Refills: 2 | Status: SHIPPED | OUTPATIENT
Start: 2024-12-11

## 2025-02-10 ENCOUNTER — PATIENT MESSAGE (OUTPATIENT)
Dept: RHEUMATOLOGY | Facility: CLINIC | Age: 60
End: 2025-02-10
Payer: COMMERCIAL

## 2025-02-10 RX ORDER — NORTRIPTYLINE HYDROCHLORIDE 10 MG/1
CAPSULE ORAL
Qty: 90 CAPSULE | Refills: 3 | Status: SHIPPED | OUTPATIENT
Start: 2025-02-10

## 2025-03-03 DIAGNOSIS — I82.0 BUDD-CHIARI SYNDROME: Primary | ICD-10-CM

## 2025-03-11 ENCOUNTER — HOSPITAL ENCOUNTER (OUTPATIENT)
Dept: RADIOLOGY | Facility: HOSPITAL | Age: 60
Discharge: HOME | End: 2025-03-11
Attending: INTERNAL MEDICINE
Payer: COMMERCIAL

## 2025-03-11 DIAGNOSIS — I82.0 BUDD-CHIARI SYNDROME: ICD-10-CM

## 2025-03-11 PROCEDURE — 76376 3D RENDER W/INTRP POSTPROCES: CPT | Mod: TC

## 2025-03-27 ENCOUNTER — OFFICE VISIT (OUTPATIENT)
Dept: FAMILY MEDICINE | Facility: CLINIC | Age: 60
End: 2025-03-27
Payer: COMMERCIAL

## 2025-03-27 VITALS
OXYGEN SATURATION: 97 % | RESPIRATION RATE: 16 BRPM | TEMPERATURE: 98 F | WEIGHT: 270.5 LBS | HEART RATE: 108 BPM | BODY MASS INDEX: 40.07 KG/M2 | SYSTOLIC BLOOD PRESSURE: 126 MMHG | HEIGHT: 69 IN | DIASTOLIC BLOOD PRESSURE: 87 MMHG

## 2025-03-27 DIAGNOSIS — R06.09 DOE (DYSPNEA ON EXERTION): ICD-10-CM

## 2025-03-27 DIAGNOSIS — K50.10 CROHN'S DISEASE OF LARGE INTESTINE WITHOUT COMPLICATION: Primary | ICD-10-CM

## 2025-03-27 DIAGNOSIS — M79.7 FIBROMYALGIA: ICD-10-CM

## 2025-03-27 DIAGNOSIS — K76.0 FATTY LIVER: ICD-10-CM

## 2025-03-27 DIAGNOSIS — R79.89 ELEVATED BRAIN NATRIURETIC PEPTIDE (BNP) LEVEL: ICD-10-CM

## 2025-03-27 DIAGNOSIS — R42 POSTURAL DIZZINESS WITH PRESYNCOPE: ICD-10-CM

## 2025-03-27 DIAGNOSIS — R55 POSTURAL DIZZINESS WITH PRESYNCOPE: ICD-10-CM

## 2025-03-27 LAB
EKG 12-LEAD: NORMAL
PR INTERVAL: NORMAL
PRT AXES: NORMAL
QRS DURATION: NORMAL
QT/QTC: NORMAL
VENTRICULAR RATE: NORMAL

## 2025-03-27 PROCEDURE — 99999 PR PBB SHADOW E&M-EST. PATIENT-LVL IV: CPT | Mod: PBBFAC,,, | Performed by: FAMILY MEDICINE

## 2025-03-27 RX ORDER — ERGOCALCIFEROL 1.25 MG/1
50000 CAPSULE ORAL
COMMUNITY
Start: 2024-12-28

## 2025-03-27 RX ORDER — PANTOPRAZOLE SODIUM 40 MG/1
TABLET, DELAYED RELEASE ORAL
COMMUNITY
Start: 2025-03-11

## 2025-03-27 NOTE — PROGRESS NOTES
Subjective:       Patient ID: Analy Fuchs is a 60 y.o. male.    Chief Complaint: Breathing Problem and Fatigue    Lab Results   Component Value Date    WBC 15.92 (H) 03/14/2024    HGB 16.5 03/14/2024    HCT 50.0 03/14/2024     03/14/2024    CHOL 134 03/14/2024    CHOL 134 03/14/2024    TRIG 56 03/14/2024    TRIG 56 03/14/2024    HDL 51 03/14/2024    HDL 51 03/14/2024    ALT 21 03/14/2024    AST 23 03/14/2024     03/14/2024    K 4.3 03/14/2024     03/14/2024    CREATININE 0.9 03/14/2024    BUN 14 03/14/2024    CO2 21 (L) 03/14/2024    PSA 0.63 03/14/2024    HGBA1C 5.3 09/06/2024         History of Present Illness    CHIEF COMPLAINT:  Mr. Fuchs presents with fatigue upon exertion and near-syncope episodes, concerned about potential cardiac issues.    HPI:  Mr. Fuchs reports fatigue with exertion and a recent near-syncope episode while performing a minor repair on his RV. During this incident, he became extremely weak, necessitating lying down for approximately 20 minutes and had visual disturbances. He notes that this episode reminds him of his past lung issues, but without the chest tightness or desire to remove clothing he had then. He reports inability to walk far without becoming lightheaded, and any exertion beyond a normal walk causes significant symptoms.    He has a history of melena, evaluated by Dr. Sanders who performed both an upper and lower endoscopy, finding very active Crohn's disease. As a result, he was prescribed prednisone and taken off Stelara. Dr. Sanders also ordered an MRI to investigate potential liver issues and bile duct blockages. He mentions elevated liver function tests.    His Crohn's disease has been causing recent issues, and he is in the process of switching medications. The first medication Dr. Sanders wanted to prescribe was denied multiple times, and they are now trying to get Skyrizi approved for his Crohn's treatment.    He has a history of lung problems  from 2004, when he had a significant decrease in lung capacity (down to 35%) and required oxygen therapy for a year. His lungs eventually regenerated, and he regained full capacity.    He also mentions having fibromyalgia, which has been more difficult to manage since he stopped using medical cannabis in early February. He is currently taking nortriptyline 50mg for symptom management.    He denies having diabetes.    MEDICATIONS:  Mr. Fuchs is on Prednisone for active Crohn's disease and Nortriptyline 50 mg for fibromyalgia. He has discontinued Stelara. Mr. Fuchs stopped alcohol use 6 months ago or more and has discontinued medical cannabis since February.    MEDICAL HISTORY:  Mr. Fuchs has a history of Crohn's disease, fatty liver, and fibromyalgia. In 2004, he experienced lung issues with 35% lung capacity and was on oxygen for a year, after which his lungs regenerated.    SURGICAL HISTORY:  Mr. Fuchs had a screw put in his right foot.    TEST RESULTS:  Mr. Masons liver function tests from March last year were elevated. He had labs done on March 3rd this year. In February this year, his CBC showed high hemoglobin, while his CMP revealed elevated sodium and potassium, likely due to steroids. Mr. Masons bilirubin and CRP were also elevated in February. Hepatitis tests were conducted in February. His sed rate from September last year was mildly elevated. Mr. Fuchs underwent a stress test in 2004, during which he lost consciousness.    IMAGING:  Recent MRCP CT enterography showed diffuse signal fallout in the liver consistent with fatty infiltration, with no gallstones or obstruction. A recent MRI revealed unremarkable organs with no mass or   tumor larger than a cm. Mr. Fuchs's recent endoscopy showed very active Crohn's disease. He also had a recent colonoscopy.    ALLERGIES:  Mr. Fuchs had a reaction to medication used in 2004, resulting in the need for oxygen for a  year.    SOCIAL HISTORY:  Alcohol: Quit 6 months ago or more. Previously used alcohol during Crohn's flare-ups for pain management, then became a social drinker.  Smoking: Denies current smoking    ROS:  Constitutional: +fatigue, +sleep disturbances, +lightheadedness  Cardiovascular: +near-syncope  Gastrointestinal: +blood in stool  Musculoskeletal: +limb swelling, +nightime pain          Allergies and Medications:   Review of patient's allergies indicates:   Allergen Reactions    Mesalamine Shortness Of Breath     Current Medications[1]    Family History:   No family history on file.    Social History:   Social History[2]        Objective:     Vitals:    03/27/25 1310   BP: 126/87   Pulse: 108   Resp: 16   Temp: 98.2 °F (36.8 °C)        Physical Exam    General: No acute distress. Well-developed. Well-nourished.  Eyes: EOMI. Sclerae anicteric.  HENT: Normocephalic. Atraumatic. Nares patent. Moist oral mucosa.  Cardiovascular: Regular rate. Regular rhythm. No murmurs. No rubs. No gallops. Normal S1, S2. Heart regular rhythm and rate with PMI in the left ventricular line.  Respiratory: Normal respiratory effort. Clear to auscultation bilaterally. No rales. No rhonchi. No wheezing.  Musculoskeletal: No  obvious deformity.  Extremities: Mild swelling in right foot.  Neurological: Alert & oriented x3. No slurred speech. Normal gait.  Psychiatric: Normal mood. Normal affect. Good insight. Good judgment.  Skin: Warm. Dry. No rash.          CT EKG shows a sinus tachycardia without ST or T changes very mild.  Assessment:       1. Crohn's disease of large intestine without complication    2. Fibromyalgia    3. Fatty liver    4. Elevated brain natriuretic peptide (BNP) level    5. CARL (dyspnea on exertion)    6. Postural dizziness with presyncope        Plan:       Assessment & Plan    K50.919 Crohn's disease, unspecified, with unspecified complications  K76.0 Fatty (change of) liver, not elsewhere classified  M79.7  Fibromyalgia  R55 Syncope and collapse  R74.8 Abnormal levels of other serum enzymes  K92.1 Melena  M25.671 Stiffness of right ankle, not elsewhere classified  Z87.09 Personal history of other diseases of the respiratory system  Z88.1 Allergy status to other antibiotic agents  Z87.898 Personal history of other specified conditions    IMPRESSION:  - Evaluated recent imaging (MRCP CT enterography) showing diffuse signal fallout in liver consistent with fatty infiltration, no gallstones or obstruction.  - Considered potential causes of fatty liver including diet, cholesterol, heredity, and past alcohol use.  - Assessed recent lab work, noting elevated liver function tests and high hemoglobin.  - Suspect possible autoimmune liver process due to significantly increased test results compared to previous year.  - Determined need for EKG due to near-syncopal episode, likely caused by BP drop.  - Considered potential cardiac issues as cause of exertional fatigue and lightheadedness.    CROHN'S DISEASE:  - Confirmed active Crohn's disease through Dr. Sanders's scope exam.  - Acknowledged the patient's Crohn's flare-up and its impact on overall health.  - Discontinued Stelara and prescribed prednisone for management of symptoms.  - Initiated process to obtain approval for Skyrizi as a new treatment for Crohn's disease.    FATTY LIVER DISEASE:  - Noted elevated liver function tests, prompting further investigation.  - Reviewed MRCP CT enterography report showing diffuse signal fallout in the liver consistent with fatty infiltration, with no gallstones or obstruction found.  - Discussed potential causes of fatty liver, including diet, cholesterol, heredity, and past alcohol use.  - Educated the patient about the potential progression of fatty liver to cirrhosis and increased risk for liver cancer.  - Planned to recheck liver functions and recommended following Dr. Sanders's advice, potentially including dietary modifications such as  eliminating red meat and sugar.    FIBROMYALGIA:  - Noted patient's report of difficulty sleeping due to fibromyalgia pain.  - Continued nortriptyline 50mg for fibromyalgia management.    SYNCOPE AND NEAR-SYNCOPE:  - Discussed that presyncope (near-fainting) is often due to a drop in blood pressure, but can also be caused by heart rhythm issues.  - Noted patient's report of near-syncope episode during physical exertion, experiencing weakness, lightheadedness, and seeing stars.  - Performed physical exam revealing clear lungs and regular heart rhythm with no murmurs, gallops, or rubs.  - Considered possible causes including orthostatic volume depletion and heart rhythm issues.  - Ordered EKG to be performed during the current visit.  - Referred patient to cardiology for evaluation of near-syncope and fatigue with exertion.    ABNORMAL ENZYME LEVELS:  - Noted elevated liver function tests and sedimentation rate.  - Reviewed recent labs showing increased levels of certain enzymes, possibly indicating an autoimmune process.  - Discussed the abnormal test results with the patient.  - Planned to recheck liver functions and follow up on the abnormal enzyme levels.    MELENA:  - Noted patient's report of black tarry stools.  - Confirmed that Dr. Sanders performed a scope exam to evaluate the black tarry stools.    RIGHT ANKLE STIFFNESS:  - Noted patient's report of right an  kle usually being swollen due to a screw insertion.  - Examined patient's feet for swelling, noting that it's not severe and not indicative of heart failure.    HISTORY OF RESPIRATORY ISSUES:  - Noted patient's history of lung issues in 2004, requiring oxygen therapy for 1 year.  - Documented improvement in patient's lung capacity from 35% to full capacity.  - Performed current exam showing clear lungs on auscultation.    ALLERGY STATUS:  - Noted patient's report of having an allergy related to a medication reaction in the past.    OTHER PERSONAL  HISTORY:  - Documented patient's history of alcohol use, which he reports quitting about 6 months ago.  - Noted patient's mention of past use of medical cannabis for pain management.    GENERAL EVALUATION:  - Evaluated organs via MRI, which were found to be unremarkable with no mass or tumor larger than 1 cm detected.        Analy was seen today for breathing problem and fatigue.    Diagnoses and all orders for this visit:    Crohn's disease of large intestine without complication    Fibromyalgia    Fatty liver    Elevated brain natriuretic peptide (BNP) level    CARL (dyspnea on exertion)  -     POCT EKG 12-LEAD (NOT FOR [facility] USE)  -     Cancel: Ambulatory referral/consult to Cardiology; Future  -     Ambulatory referral/consult to Cardiology; Future    Postural dizziness with presyncope  -     POCT EKG 12-LEAD (NOT FOR [facility] USE)  -     Cancel: Ambulatory referral/consult to Cardiology; Future  -     Ambulatory referral/consult to Cardiology; Future         Follow up in about 1 month (around 4/27/2025) for follow up chf.  This note was generated with the assistance of ambient listening technology. Verbal consent was obtained by the patient and accompanying visitor(s) for the recording of patient appointment to facilitate this note. I attest to having reviewed and edited the generated note for accuracy, though some syntax or spelling errors may persist. Please contact the author of this note for any clarification.         Answers submitted by the patient for this visit:  Shortness of Breath Questionnaire (Submitted on 3/27/2025)  Chief Complaint: Shortness of breath  Chronicity: new  Onset: 1 to 4 weeks ago  Frequency: daily  Progression since onset: unchanged  abdominal pain: No  chest pain: No  hemoptysis: No  sputum production: No  PND: No  ear pain: No  syncope: Yes  fever: No  headaches: Yes  claudication: No  leg pain: No  leg swelling: No  neck pain: No  rash: No  rhinorrhea: No  orthopnea: No  sore  throat: No  coryza: No  swollen glands: No  vomiting: No  wheezing: No  Aggravating factors: occupational exposure, exercise, any activity  Risk factors for DVT/PE: no known risk factors  Treatments tried: rest  asthma: No  allergies: No  COPD: No  pneumonia: Yes  aspirin allergies: No  CAD: No  DVT: No  heart failure: No  PE: No  recent surgery: No  bronchiolitis: No  chronic lung disease: No         [1]   Current Outpatient Medications   Medication Sig Dispense Refill    ergocalciferol (ERGOCALCIFEROL) 50,000 unit Cap Take 50,000 Units by mouth.      pantoprazole (PROTONIX) 40 MG tablet SMARTSI Tablet(s) By Mouth Morning-Evening       No current facility-administered medications for this visit.   [2]   Social History  Socioeconomic History    Marital status:    Tobacco Use    Smoking status: Former     Current packs/day: 0.00     Types: Cigarettes     Quit date:      Years since quittin.2    Smokeless tobacco: Never   Substance and Sexual Activity    Alcohol use: Yes     Comment: occ    Drug use: Not Currently     Types: Marijuana    Sexual activity: Yes     Partners: Female     Social Drivers of Health     Financial Resource Strain: Low Risk  (3/27/2025)    Overall Financial Resource Strain (CARDIA)     Difficulty of Paying Living Expenses: Not very hard   Food Insecurity: No Food Insecurity (3/27/2025)    Hunger Vital Sign     Worried About Running Out of Food in the Last Year: Never true     Ran Out of Food in the Last Year: Never true   Transportation Needs: No Transportation Needs (3/27/2025)    PRAPARE - Transportation     Lack of Transportation (Medical): No     Lack of Transportation (Non-Medical): No   Physical Activity: Inactive (3/27/2025)    Exercise Vital Sign     Days of Exercise per Week: 0 days     Minutes of Exercise per Session: 0 min   Stress: No Stress Concern Present (3/27/2025)    [employer] - Occupational Stress Questionnaire     Feeling of  Stress : Not at all   Housing Stability: Low Risk  (3/27/2025)    Housing Stability Vital Sign     Unable to Pay for Housing in the Last Year: No     Number of Times Moved in the Last Year: 0     Homeless in the Last Year: No

## 2025-03-31 ENCOUNTER — PATIENT MESSAGE (OUTPATIENT)
Dept: RHEUMATOLOGY | Facility: CLINIC | Age: 60
End: 2025-03-31
Payer: COMMERCIAL

## 2025-03-31 ENCOUNTER — TELEPHONE (OUTPATIENT)
Dept: FAMILY MEDICINE | Facility: CLINIC | Age: 60
End: 2025-03-31
Payer: COMMERCIAL

## 2025-03-31 ENCOUNTER — RESULTS FOLLOW-UP (OUTPATIENT)
Dept: FAMILY MEDICINE | Facility: CLINIC | Age: 60
End: 2025-03-31

## 2025-03-31 LAB
OHS QRS DURATION: 78 MS
OHS QTC CALCULATION: 427 MS

## 2025-03-31 NOTE — TELEPHONE ENCOUNTER
----- Message from Jovanny Ag MD sent at 3/31/2025  1:11 PM CDT -----  Results Ok, notify patient.  ----- Message -----  From: Khushi, [facility]  Sent: 3/31/2025   8:51 AM CDT  To: Jovanny Ag MD

## 2025-04-23 ENCOUNTER — OFFICE VISIT (OUTPATIENT)
Dept: RHEUMATOLOGY | Facility: CLINIC | Age: 60
End: 2025-04-23
Payer: COMMERCIAL

## 2025-04-23 VITALS
SYSTOLIC BLOOD PRESSURE: 111 MMHG | DIASTOLIC BLOOD PRESSURE: 73 MMHG | WEIGHT: 272.69 LBS | BODY MASS INDEX: 40.27 KG/M2 | HEART RATE: 71 BPM

## 2025-04-23 DIAGNOSIS — K50.10 CROHN'S DISEASE OF LARGE INTESTINE WITHOUT COMPLICATION: ICD-10-CM

## 2025-04-23 DIAGNOSIS — M79.7 FIBROMYALGIA: Primary | ICD-10-CM

## 2025-04-23 PROCEDURE — 3008F BODY MASS INDEX DOCD: CPT | Mod: CPTII,S$GLB,, | Performed by: INTERNAL MEDICINE

## 2025-04-23 PROCEDURE — 3078F DIAST BP <80 MM HG: CPT | Mod: CPTII,S$GLB,, | Performed by: INTERNAL MEDICINE

## 2025-04-23 PROCEDURE — 99213 OFFICE O/P EST LOW 20 MIN: CPT | Mod: S$GLB,,, | Performed by: INTERNAL MEDICINE

## 2025-04-23 PROCEDURE — 1159F MED LIST DOCD IN RCRD: CPT | Mod: CPTII,S$GLB,, | Performed by: INTERNAL MEDICINE

## 2025-04-23 PROCEDURE — 1160F RVW MEDS BY RX/DR IN RCRD: CPT | Mod: CPTII,S$GLB,, | Performed by: INTERNAL MEDICINE

## 2025-04-23 PROCEDURE — G2211 COMPLEX E/M VISIT ADD ON: HCPCS | Mod: S$GLB,,, | Performed by: INTERNAL MEDICINE

## 2025-04-23 PROCEDURE — 99999 PR PBB SHADOW E&M-EST. PATIENT-LVL III: CPT | Mod: PBBFAC,,, | Performed by: INTERNAL MEDICINE

## 2025-04-23 PROCEDURE — 3074F SYST BP LT 130 MM HG: CPT | Mod: CPTII,S$GLB,, | Performed by: INTERNAL MEDICINE

## 2025-04-23 RX ORDER — RISANKIZUMAB-RZAA 60 MG/ML
INJECTION INTRAVENOUS
COMMUNITY

## 2025-04-23 NOTE — PROGRESS NOTES
4/22/2025     2:04 PM   Rapid3 Question Responses and Scores   MDHAQ Score 1.1   Psychologic Score 4.4   Pain Score 7   When you awakened in the morning OVER THE LAST WEEK, did you feel stiff? Yes   If Yes, please indicate the number of hours until you are as limber as you will be for the day 1   Fatigue Score 9   Global Health Score 9   RAPID3 Score 6.56     Answers submitted by the patient for this visit:  Rheumatology Questionnaire (Submitted on 4/22/2025)  fever: No  eye redness: No  mouth sores: No  headaches: No  shortness of breath: Yes  chest pain: No  trouble swallowing: No  diarrhea: Yes  constipation: No  unexpected weight change: No  genital sore: No  During the last 3 days, have you had a skin rash?: No  Bruises or bleeds easily: No  cough: No

## 2025-04-25 NOTE — PROGRESS NOTES
History of present illness:  59-year-old male I saw initially in March.  He has a 3 year history of diffuse aching.  This began when he was on Cipro for diffuse aching.  He has a history of Crohn's disease and has been on Stelara.  Clinically he has no evidence to suggest an inflammatory arthritis.  I felt he most likely had fibromyalgia.  I placed him initially on gabapentin.     He had a reaction to gabapentin and I changed him to Lyrica.  I increase the dosage when he was seen in October.  I got him up to 450 mg but it really did not help.  I then changed him to nortriptyline.  He did not tolerate this and I just started him on Cymbalta.  He got the prescription filled but has not started it yet.  His worst problem error in his arms and legs.    He was changed to Skyrizzi for his Crohn's disease.  He has also had courses of prednisone.  This is starting to do some better.    Physical examination was not performed, the entire time was counseling.    Assessment:  1.  Fibromyalgia   2.  Crohn's disease   Discussion:  I told him that unfortunately we may not be able to get his fibromyalgia under control until his Crohn's disease is doing better.  I will see how he is doing with the Cymbalta.  I did recommend trying yoga.  I plan on seeing him back in 12 months.      Answers submitted by the patient for this visit:  Rheumatology Questionnaire (Submitted on 4/22/2025)  fever: No  eye redness: No  mouth sores: No  headaches: No  shortness of breath: Yes  chest pain: No  trouble swallowing: No  diarrhea: Yes  constipation: No  unexpected weight change: No  genital sore: No  During the last 3 days, have you had a skin rash?: No  Bruises or bleeds easily: No  cough: No

## 2025-05-05 ENCOUNTER — OFFICE VISIT (OUTPATIENT)
Dept: FAMILY MEDICINE | Facility: CLINIC | Age: 60
End: 2025-05-05
Payer: COMMERCIAL

## 2025-05-05 VITALS
BODY MASS INDEX: 40.26 KG/M2 | TEMPERATURE: 98 F | HEART RATE: 71 BPM | DIASTOLIC BLOOD PRESSURE: 78 MMHG | WEIGHT: 271.81 LBS | HEIGHT: 69 IN | SYSTOLIC BLOOD PRESSURE: 114 MMHG | OXYGEN SATURATION: 97 %

## 2025-05-05 DIAGNOSIS — J06.9 UPPER RESPIRATORY TRACT INFECTION, UNSPECIFIED TYPE: Primary | ICD-10-CM

## 2025-05-05 DIAGNOSIS — J02.9 SORE THROAT: ICD-10-CM

## 2025-05-05 LAB
CTP QC/QA: YES
MOLECULAR STREP A: NEGATIVE
POC MOLECULAR INFLUENZA A AGN: NEGATIVE
POC MOLECULAR INFLUENZA B AGN: NEGATIVE
SARS-COV-2 RDRP RESP QL NAA+PROBE: NEGATIVE

## 2025-05-05 PROCEDURE — 87651 STREP A DNA AMP PROBE: CPT | Mod: QW,S$GLB,,

## 2025-05-05 PROCEDURE — 99999 PR PBB SHADOW E&M-EST. PATIENT-LVL III: CPT | Mod: PBBFAC,,,

## 2025-05-05 PROCEDURE — 87502 INFLUENZA DNA AMP PROBE: CPT | Mod: QW,S$GLB,,

## 2025-05-05 RX ORDER — FLUTICASONE PROPIONATE 50 MCG
1 SPRAY, SUSPENSION (ML) NASAL 2 TIMES DAILY
Qty: 9.9 ML | Refills: 2 | Status: SHIPPED | OUTPATIENT
Start: 2025-05-05

## 2025-05-05 RX ORDER — LORATADINE 10 MG/1
10 TABLET ORAL DAILY
Qty: 30 TABLET | Refills: 0 | Status: SHIPPED | OUTPATIENT
Start: 2025-05-05 | End: 2025-06-04

## 2025-05-05 NOTE — PROGRESS NOTES
[facility]     Subjective:       Patient ID:  [id number]     Chief Complaint: Sore Throat    Analy Fuchs is a 60 y.o. male with a past medical history significant for Crohn's disease and fibromyalgia who presents to the clinic for sore throat.     Mr. Fuchs presents today for sore throat and fatigue. He reports onset of general malaise on Friday, followed by sore throat on Saturday and nasal congestion with green discharge on Sunday. He experiences chills without measured fever. He has an occasional productive cough with green sputum, however for the most part the cough is dry. He additionally reports fatigue. He also experiences headaches treated with Tylenol and intermittent nausea without vomiting. He denies any abdominal pain, however does have diarrhea due to Crohn's. He reports potential COVID exposure risk from neighbor's children last week. Due to his compromised immune system, he maintains distance when others are sick. He does report chronic shortness of breath in which he is being worked up by his PCP, however denies any worsening or chest pain.       Review of Systems   Constitutional:  Positive for chills. Negative for fever.   HENT:  Positive for congestion, postnasal drip, rhinorrhea and sore throat. Negative for ear discharge, ear pain, sinus pressure and sinus pain.    Respiratory:  Positive for cough and shortness of breath (chronic - not worsening).    Gastrointestinal:  Positive for diarrhea (chronic). Negative for abdominal pain, nausea and vomiting.   Neurological:  Positive for headaches.        Past Medical History:   Diagnosis Date    Crohn's disease         Active Problem List with Overview Notes    Diagnosis Date Noted    Fibromyalgia 03/22/2024    Crohn's disease without complication 03/22/2024        Review of patient's allergies indicates:   Allergen Reactions    Mesalamine Shortness Of Breath       Current Medications[1]  Lab Results   Component Value Date    WBC  15.92 (H) 03/14/2024    HGB 16.5 03/14/2024    HCT 50.0 03/14/2024     03/14/2024    CHOL 134 03/14/2024    CHOL 134 03/14/2024    TRIG 56 03/14/2024    TRIG 56 03/14/2024    HDL 51 03/14/2024    HDL 51 03/14/2024    ALT 21 03/14/2024    AST 23 03/14/2024     03/14/2024    K 4.3 03/14/2024     03/14/2024    CREATININE 0.9 03/14/2024    BUN 14 03/14/2024    CO2 21 (L) 03/14/2024    PSA 0.63 03/14/2024    HGBA1C 5.3 09/06/2024           Objective:      Physical Exam  Constitutional:       General: He is not in acute distress.     Appearance: Normal appearance. He is not toxic-appearing.   HENT:      Head: Normocephalic and atraumatic.      Right Ear: There is impacted cerumen.      Left Ear: There is impacted cerumen.      Nose: Nose normal.      Right Sinus: No maxillary sinus tenderness or frontal sinus tenderness.      Left Sinus: No maxillary sinus tenderness or frontal sinus tenderness.      Mouth/Throat:      Pharynx: Posterior oropharyngeal erythema and postnasal drip present.   Cardiovascular:      Rate and Rhythm: Normal rate and regular rhythm.      Pulses: Normal pulses.      Heart sounds: No murmur heard.     No friction rub.   Pulmonary:      Effort: Pulmonary effort is normal. No respiratory distress.      Breath sounds: Normal breath sounds. No wheezing.   Musculoskeletal:      Cervical back: Normal range of motion.      Right lower leg: No edema.      Left lower leg: No edema.   Skin:     General: Skin is warm and dry.   Neurological:      General: No focal deficit present.      Mental Status: He is alert and oriented to person, place, and time.   Psychiatric:         Mood and Affect: Mood normal.           Assessment:       1. Upper respiratory tract infection, unspecified type    2. Sore throat          Plan:         Assessment & Plan    - Presenting with sore throat, fatigue, nasal congestion, and cough with green sputum since Friday.  - Considered post-nasal drip as potential  cause for throat irritation.  - Strep, COVID, and Flu are negative.   - Discussed treating as viral URI.        Analy was seen today for sore throat.    Diagnoses and all orders for this visit:    Upper respiratory tract infection, unspecified type  -     POCT COVID-19 Rapid Screening  -     POCT Influenza A/B Molecular  -     loratadine (CLARITIN) 10 mg tablet; Take 1 tablet (10 mg total) by mouth once daily.  -     fluticasone propionate (FLONASE) 50 mcg/actuation nasal spray; 1 spray (50 mcg total) by Each Nostril route 2 (two) times daily. Point up and slightly outward toward ear when spraying to avoid irritating nasal septum.    Sore throat  -     POCT COVID-19 Rapid Screening  -     POCT Influenza A/B Molecular  -     POCT Strep A, Molecular  -     loratadine (CLARITIN) 10 mg tablet; Take 1 tablet (10 mg total) by mouth once daily.  -     fluticasone propionate (FLONASE) 50 mcg/actuation nasal spray; 1 spray (50 mcg total) by Each Nostril route 2 (two) times daily. Point up and slightly outward toward ear when spraying to avoid irritating nasal septum.             Follow up for if symptoms are not improved.    Future Appointments       Date Provider Specialty Appt Notes    6/13/2025 Jovanny Ag MD Family Medicine 1 month f/u for chf    7/17/2025 Flo Acevedo MD Cardiology CARL (dyspnea on exertion)  Postural dizziness with presyncope             All of your core healthy metrics are met.       I spent a total of 20 minutes on the day of the visit.This includes face to face time and non-face to face time preparing to see the patient (eg, review of tests), obtaining and/or reviewing separately obtained history, documenting clinical information in the electronic or other health record, independently interpreting results and communicating results to the patient/family/caregiver, or care coordinator.    This note was generated with the assistance of ambient listening technology. Verbal consent was obtained by  the patient and accompanying visitor(s) for the recording of patient appointment to facilitate this note. I attest to having reviewed and edited the generated note for accuracy, though some syntax or spelling errors may persist. Please contact the author of this note for any clarification.      Nakia Carreon PA-C  Family Medicine Physician Assistant            [1]   Current Outpatient Medications:     ergocalciferol (ERGOCALCIFEROL) 50,000 unit Cap, Take 50,000 Units by mouth., Disp: , Rfl:     pantoprazole (PROTONIX) 40 MG tablet, SMARTSI Tablet(s) By Mouth Morning-Evening, Disp: , Rfl:     risankizumab-rzaa (SKYRIZI) 60 mg/mL Soln, Inject into the vein., Disp: , Rfl:     fluticasone propionate (FLONASE) 50 mcg/actuation nasal spray, 1 spray (50 mcg total) by Each Nostril route 2 (two) times daily. Point up and slightly outward toward ear when spraying to avoid irritating nasal septum., Disp: 9.9 mL, Rfl: 2    loratadine (CLARITIN) 10 mg tablet, Take 1 tablet (10 mg total) by mouth once daily., Disp: 30 tablet, Rfl: 0    milnacipran (SAVELLA) 12.5 mg Tab tablet, Take 1 tablet (12.5 mg total) by mouth 2 (two) times daily. (Patient not taking: Reported on 2025), Disp: 60 tablet, Rfl: 3

## 2025-05-27 DIAGNOSIS — J02.9 SORE THROAT: ICD-10-CM

## 2025-05-27 DIAGNOSIS — J06.9 UPPER RESPIRATORY TRACT INFECTION, UNSPECIFIED TYPE: ICD-10-CM

## 2025-05-27 RX ORDER — LORATADINE 10 MG/1
10 TABLET ORAL
Qty: 90 TABLET | Refills: 1 | Status: SHIPPED | OUTPATIENT
Start: 2025-05-27

## 2025-05-28 ENCOUNTER — OFFICE VISIT (OUTPATIENT)
Dept: FAMILY MEDICINE | Facility: CLINIC | Age: 60
End: 2025-05-28
Payer: COMMERCIAL

## 2025-05-28 VITALS
HEART RATE: 73 BPM | OXYGEN SATURATION: 98 % | HEIGHT: 69 IN | BODY MASS INDEX: 39.12 KG/M2 | WEIGHT: 264.13 LBS | TEMPERATURE: 98 F

## 2025-05-28 DIAGNOSIS — M79.7 FIBROMYALGIA: Primary | ICD-10-CM

## 2025-05-28 DIAGNOSIS — Z00.00 PREVENTATIVE HEALTH CARE: ICD-10-CM

## 2025-05-28 DIAGNOSIS — K50.10 CROHN'S DISEASE OF LARGE INTESTINE WITHOUT COMPLICATION: ICD-10-CM

## 2025-05-28 PROCEDURE — 99999 PR PBB SHADOW E&M-EST. PATIENT-LVL III: CPT | Mod: PBBFAC,,, | Performed by: FAMILY MEDICINE

## 2025-05-28 PROCEDURE — 90715 TDAP VACCINE 7 YRS/> IM: CPT | Mod: S$GLB,,, | Performed by: FAMILY MEDICINE

## 2025-05-28 PROCEDURE — 90471 IMMUNIZATION ADMIN: CPT | Mod: S$GLB,,, | Performed by: FAMILY MEDICINE

## 2025-05-28 PROCEDURE — 3008F BODY MASS INDEX DOCD: CPT | Mod: CPTII,S$GLB,, | Performed by: FAMILY MEDICINE

## 2025-05-28 PROCEDURE — 90472 IMMUNIZATION ADMIN EACH ADD: CPT | Mod: S$GLB,,, | Performed by: FAMILY MEDICINE

## 2025-05-28 PROCEDURE — 90677 PCV20 VACCINE IM: CPT | Mod: S$GLB,,, | Performed by: FAMILY MEDICINE

## 2025-05-28 PROCEDURE — 99213 OFFICE O/P EST LOW 20 MIN: CPT | Mod: 25,S$GLB,, | Performed by: FAMILY MEDICINE

## 2025-05-28 RX ORDER — RISANKIZUMAB-RZAA 180 MG/1.2
KIT SUBCUTANEOUS
COMMUNITY
Start: 2025-05-07

## 2025-05-28 RX ORDER — TRAZODONE HYDROCHLORIDE 100 MG/1
100 TABLET ORAL NIGHTLY
Qty: 30 TABLET | Refills: 11 | Status: SHIPPED | OUTPATIENT
Start: 2025-05-28 | End: 2026-05-28

## 2025-05-28 NOTE — PROGRESS NOTES
Subjective:       Patient ID: Analy Fuchs is a 60 y.o. male.    Chief Complaint: Generalized Body Aches, Follow-up, and Medication Problem      History of Present Illness    CHIEF COMPLAINT:  Mr. Fuchs presents for a follow-up visit to discuss fibromyalgia symptoms and treatment options.    HPI:  Mr. Fuchs has been diagnosed with fibromyalgia by a rheumatologist. He reports generalized body aches and constant pain throughout his body, particularly in his arms and legs, with a specific spot underneath the top part of his leg feeling very tight and painful. He describes the pain as severe, noting intense pain after sitting for an extended period during a recent flight.    His symptoms have been progressively worsening over time, with a gradual decrease in strength and ability to perform tasks he could previously do, such as using a chainsaw, throwing hay geo, and opening jars. He also reports stiffness, particularly after physical activity, mentioning significant difficulty moving after swimming for 5-10 minutes.    His sleep is affected by his condition. He sleeps about 5 hours maximum per night, partly due to his Crohn's disease. He does not feel rested upon waking and has tense and tight muscles in the morning.    He has tried several medications prescribed by the rheumatologist for fibromyalgia but discontinued them due to side effects. One medication elevated his heart rate and caused occasional dizziness and pressure. Another caused excessive sleepiness, and a third had side effects that he felt outweighed the benefits.    Currently, cannabis is the only treatment providing him relief from pain and helping him sleep. He uses it only at night and notes that it helps him fall asleep quickly when the pain subsides.    He uses a TENS unit and a small handheld massage device for symptom management. He has not yet tried physical therapy for his fibromyalgia symptoms.    He denies having any cough,  neurologic condition, or muscular dystrophy.    MEDICATIONS:  Mr. Fuchs uses cannabis at night for pain relief and sleep. He also uses a TENS unit for pain management and is on Skyrizi for Crohn's disease. He discontinued an unspecified medication prescribed by his rheumatologist due to elevated heart rate, dizziness, and occasional pressure. Another unspecified medication was discontinued after 3 days due to excessive sleepiness. A third unspecified medication was not started by the patient's choice.    MEDICAL HISTORY:  Mr. Fuchs has a history of fibromyalgia and Crohn's disease. Mr. Fuchs received a tetanus vaccine in 2012.    TEST RESULTS:  Mr. Fuchs's Vitamin D levels were low in March. A CMP conducted in March showed slightly elevated CO2 levels, but was otherwise normal. C-reactive protein was also tested in March.    IMAGING:  Mr. Fuchs underwent an MRI of the abdomen due to pancreatic concerns. A chest XR was performed, which showed no evidence of asbestosis.    SOCIAL HISTORY:  Smoking: Quit in 2000 when son was born, smoked for approximately 20 years Occupation: Former federal officer for 14 years      ROS:  Constitutional: +fatigue, +sleep disturbances  Respiratory: -cough  Musculoskeletal: +limb pain, +muscle weakness, +body aches          Allergies and Medications:   Review of patient's allergies indicates:   Allergen Reactions    Mesalamine Shortness Of Breath     Current Medications[1]    Family History:   No family history on file.    Social History:   Social History[2]        Objective:     Vitals:    05/28/25 1316   BP: (P) 120/62   Pulse: 73   Temp: 98 °F (36.7 °C)        Physical Exam    General: No acute distress. Well-developed. Well-nourished.  Eyes: EOMI. Sclerae anicteric.  HENT: Normocephalic. Atraumatic. Nares patent. Moist oral mucosa.  Cardiovascular: Regular rate. Regular rhythm. No murmurs. No rubs. No gallops. Normal S1, S2.  Respiratory: Normal respiratory  effort. Clear to auscultation bilaterally. No rales. No rhonchi. No wheezing.  Musculoskeletal: No  obvious deformity. Left upper trapezius herniated trigger point. Right upper trapezius herniated trigger point.  Extremities: No lower extremity edema.  Neurological: Alert & oriented x3. No slurred speech. Normal gait.  Psychiatric: Normal mood. Normal affect. Good insight. Good judgment.  Skin: Warm. Dry. No rash.            Assessment:       1. Fibromyalgia    2. Crohn's disease of large intestine without complication    3. Preventative health care        Plan:       Assessment & Plan    M79.7 Fibromyalgia    FIBROMYALGIA:  - Assessed patient's fibromyalgia symptoms, noting generalized body aches, pain in arms and legs, and muscle spasms.  - Pain is constant and severe, sometimes causing tears.  - Confirmed ropy trigger points palpable in the trapezius and shoulders.  - Previous diagnosis was confirmed by a rheumatologist.  - Explained fibromyalgia as a poorly understood but well-defined neuromuscular condition affecting peripheral nerves, more common in women, typically lasting decades with potential resolution around age 60-65.  - Mr. Fuchs reports difficulty moving after physical activity due to muscle stiffness and achieves pain relief with cannabis use.    PREVENTIVE CARE:  - Considered CT Lungs due to history of asbestos exposure and smoking, but determined patient does not meet current criteria for insurance coverage.  - Reviewed recent bloodwork results, noting low Vitamin D levels; continued weekly Vitamin D supplement.  - Administered Tdap (tetanus, diphtheria, pertussis) and pneumococcal vaccines.        Analy was seen today for generalized body aches, follow-up and medication problem.    Diagnoses and all orders for this visit:    Fibromyalgia  -     Ambulatory Referral/Consult to Physical Therapy; Future  -     traZODone (DESYREL) 100 MG tablet; Take 1 tablet (100 mg total) by mouth every  evening.    Crohn's disease of large intestine without complication    Preventative health care  -     Tdap (BOOSTRIX) vaccine injection 0.5 mL  -     pneumoc 20-josie conj-dip cr(PF) (PREVNAR-20 (PF)) injection Syrg 0.5 mL         Follow up in about 6 months (around 2025).  This note was generated with the assistance of ambient listening technology. Verbal consent was obtained by the patient and accompanying visitor(s) for the recording of patient appointment to facilitate this note. I attest to having reviewed and edited the generated note for accuracy, though some syntax or spelling errors may persist. Please contact the author of this note for any clarification.            [1]   Current Outpatient Medications   Medication Sig Dispense Refill    ergocalciferol (ERGOCALCIFEROL) 50,000 unit Cap Take 50,000 Units by mouth.      fluticasone propionate (FLONASE) 50 mcg/actuation nasal spray 1 spray (50 mcg total) by Each Nostril route 2 (two) times daily. Point up and slightly outward toward ear when spraying to avoid irritating nasal septum. 9.9 mL 2    loratadine (CLARITIN) 10 mg tablet TAKE 1 TABLET BY MOUTH EVERY DAY 90 tablet 1    pantoprazole (PROTONIX) 40 MG tablet SMARTSI Tablet(s) By Mouth Morning-Evening      risankizumab-rzaa (SKYRIZI) 60 mg/mL Soln Inject into the vein.      milnacipran (SAVELLA) 12.5 mg Tab tablet Take 1 tablet (12.5 mg total) by mouth 2 (two) times daily. (Patient not taking: Reported on 2025) 60 tablet 3    SKYRIZI 180 mg/1.2 mL (150 mg/mL) Injt Inject into the skin. (Patient not taking: Reported on 2025)      traZODone (DESYREL) 100 MG tablet Take 1 tablet (100 mg total) by mouth every evening. 30 tablet 11     No current facility-administered medications for this visit.   [2]   Social History  Socioeconomic History    Marital status:    Tobacco Use    Smoking status: Former     Current packs/day: 0.00     Types: Cigarettes     Quit date: 2000     Years since  quittin.4    Smokeless tobacco: Never   Substance and Sexual Activity    Alcohol use: Yes     Comment: occ    Drug use: Not Currently     Types: Marijuana    Sexual activity: Yes     Partners: Female     Social Drivers of Health     Financial Resource Strain: Low Risk  (3/27/2025)    Overall Financial Resource Strain (CARDIA)     Difficulty of Paying Living Expenses: Not very hard   Food Insecurity: No Food Insecurity (3/27/2025)    Hunger Vital Sign     Worried About Running Out of Food in the Last Year: Never true     Ran Out of Food in the Last Year: Never true   Transportation Needs: No Transportation Needs (3/27/2025)    PRAPARE - Transportation     Lack of Transportation (Medical): No     Lack of Transportation (Non-Medical): No   Physical Activity: Inactive (3/27/2025)    Exercise Vital Sign     Days of Exercise per Week: 0 days     Minutes of Exercise per Session: 0 min   Stress: No Stress Concern Present (3/27/2025)    [employer] - Occupational Stress Questionnaire     Feeling of Stress : Not at all   Housing Stability: Low Risk  (3/27/2025)    Housing Stability Vital Sign     Unable to Pay for Housing in the Last Year: No     Number of Times Moved in the Last Year: 0     Homeless in the Last Year: No

## 2025-06-13 ENCOUNTER — PATIENT MESSAGE (OUTPATIENT)
Dept: FAMILY MEDICINE | Facility: CLINIC | Age: 60
End: 2025-06-13
Payer: COMMERCIAL

## 2025-06-13 DIAGNOSIS — M79.7 FIBROMYALGIA: Primary | ICD-10-CM
